# Patient Record
Sex: FEMALE | Race: BLACK OR AFRICAN AMERICAN | Employment: OTHER | URBAN - METROPOLITAN AREA
[De-identification: names, ages, dates, MRNs, and addresses within clinical notes are randomized per-mention and may not be internally consistent; named-entity substitution may affect disease eponyms.]

---

## 2018-06-03 PROBLEM — N31.9 NEUROGENIC BLADDER: Status: ACTIVE | Noted: 2018-06-03

## 2018-06-03 PROBLEM — R33.9 URINARY RETENTION: Status: ACTIVE | Noted: 2018-06-03

## 2018-06-04 PROBLEM — K31.6 GASTROCUTANEOUS FISTULA: Status: ACTIVE | Noted: 2018-06-04

## 2018-06-11 ENCOUNTER — HOSPITAL ENCOUNTER (OUTPATIENT)
Dept: PHYSICAL THERAPY | Age: 43
Discharge: HOME | End: 2018-06-11
Payer: MEDICARE

## 2018-06-11 PROCEDURE — G8978 MOBILITY CURRENT STATUS: HCPCS

## 2018-06-11 PROCEDURE — 97165 OT EVAL LOW COMPLEX 30 MIN: CPT

## 2018-06-11 PROCEDURE — G8980 MOBILITY D/C STATUS: HCPCS

## 2018-06-11 PROCEDURE — G8979 MOBILITY GOAL STATUS: HCPCS

## 2018-06-11 NOTE — PROGRESS NOTES
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]  Other malaise [R53.81]    SUBJECTIVE  Pain Level (0-10 scale): 0/10  Any medication changes, allergies to medications, adverse drug reactions, diagnosis change, or new procedure performed?: [x] No    [] Yes (see summary sheet for update)  Subjective functional status/changes:   [] No changes reported  She will push a few times, then she stops ( caregiver)    OBJECTIVE    Patient seen for mobility evaluaiton only    With   [] TE   [] TA   [] neuro   [] other: Patient Education: [x] Review HEP    [] Progressed/Changed HEP based on: order process  [] positioning   [] body mechanics   [] transfers   [] heat/ice application   [] Splint wear/care   [] Sensory re-education   [] scar management      [] other:          Other Objective/Functional Measures:   See mobility eval     Pain Level (0-10 scale) post treatment: 0/10    ASSESSMENT/Changes in Function:          [x]  See Plan of Care  []  See progress note/recertification  [x]  See Discharge Summary             PLAN  []  Upgrade activities as tolerated     []  Continue plan of care  []  Update interventions per flow sheet       []  Discharge due to:_  []  Other:_      SJ Nguyen/L 2018  11:51 AM    Future Appointments  Date Time Provider Raymond Bucio   2018 11:30 AM [address] [place] [address]   2018 9:30 AM Cherelle Prado MD [address] [facility]

## 2018-06-11 NOTE — PROGRESS NOTES
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 Assessment/ key information: 44year old female with birth history of paraplegia from spina bifida as well as weakness on right side due to cerebral palsy and heart disease. She is completely non ambulatory and requires total assist for transfers. She currently has a lightweight manual wheelchair but is not able to push it independently for adequate distances to participate fully in activities in home or in planned day program environment due to endurance issues in upper extremities and weakness in right upper extremity. Her lower extremity strength is 0/5. Upper extremity strength is 2+/5 for right and 4/5 for left arm. She requires total assist for all tasks except eating. She cannot use a scooter due to short stature and need for assist with transfers which would be impeded with scooter platform. Additionally she has history of decubitus between legs and on ischium and requires specialty cushion which could not be used in scooter.   She requires a power wheelchair with left side controls to allow her to move through her home and day program to attend activities, eat meals and be positioned for transfers to bed. Swing away joystick and leg and armrests will be needed to assist with transfers. A replacement Drea Bleak fusion or similar cushion is required to continue to prevent pressure sores. Power tilt recline will allow caregiver to more easily change her and will reduce pressure during day program, as she has limited ability to self position   Without a power wheelchair she will require another person to push her to all activities in day program and to bathroom, dining area and activities in home and community. A power wheelchair will allow her to move through her home to attend meals and family activities and to attend and participate in functions in day program.      Evaluation Complexity: History LOW Complexity : Brief history review  Examination LOW Complexity : 1-3 performance deficits relating to physical, cognitive , or psychosocial skils that result in activity limitations and / or participation restrictions  Clinical Decision Making LOW Complexity : No comorbidities that affect functional and no verbal or physical assistance needed to complete eval tasks   Overall Complexity Rating: LOW     Problem List: Decreased range of motion, Decreased strength, Decreased coordination/prehension, Decreased ADL/functional abilities , Decreased activity tolerance, Decreased flexibility/joint mobility and Decreased transfer abilities     Treatment Plan may include any combination of the following: Patient education    Patient / Family readiness to learn indicated by: trying to perform skills and interest    Persons(s) to be included in education:   patient (P) and family support person (FSP);list cousin    Barriers to Learning/Limitations: yes;  cognitive and reading/writing    Patient Goal (s): Be able to get around at day program and in home    Patient Self Reported Health Status: poor    Rehabilitation Potential: good    Short Term Goals: To be accomplished in 1 treatments:  1.  Evaluate patient for need for power mobility and make recommendations      Frequency / Duration: Patient to be seen 1 times per week for 1 treatments:    Patient/ Caregiver education and instruction: Diagnosis, prognosis, other order process  [x]  Plan of care has been reviewed with GUZMAN    Mobility   Current  CM= 80-99%   Goal  CM= 80-99%  D/C  CM= 80-99%    The severity rating is based on clinical judgment and the FOTO score. Certification Period: 6/11/18-6/30/18    Naomi Barone, OTR/L 6/11/2018 11:57 AM      ________________________________________________________________________    I certify that the above Therapy Services are being furnished while the patient is under my care. I agree with the treatment plan and certify that this therapy is necessary.     Physician's Signature:____________________Date:____________Time:__________    Please sign and return to [facility]  Lum Giancarlo   [address] [facility]  [phone] fax

## 2018-08-23 PROBLEM — N39.42 INCONTINENCE WITHOUT SENSORY AWARENESS: Status: ACTIVE | Noted: 2018-08-23
